# Patient Record
Sex: MALE | Race: BLACK OR AFRICAN AMERICAN | URBAN - METROPOLITAN AREA
[De-identification: names, ages, dates, MRNs, and addresses within clinical notes are randomized per-mention and may not be internally consistent; named-entity substitution may affect disease eponyms.]

---

## 2024-04-23 ENCOUNTER — OFFICE VISIT (OUTPATIENT)
Age: 54
End: 2024-04-23
Payer: MEDICAID

## 2024-04-23 VITALS — HEIGHT: 73 IN | BODY MASS INDEX: 41.75 KG/M2 | WEIGHT: 315 LBS

## 2024-04-23 DIAGNOSIS — E11.9 TYPE 2 DIABETES MELLITUS WITHOUT COMPLICATION, WITHOUT LONG-TERM CURRENT USE OF INSULIN (HCC): ICD-10-CM

## 2024-04-23 DIAGNOSIS — M17.12 PRIMARY OSTEOARTHRITIS OF LEFT KNEE: ICD-10-CM

## 2024-04-23 DIAGNOSIS — I10 HYPERTENSION, UNSPECIFIED TYPE: ICD-10-CM

## 2024-04-23 DIAGNOSIS — M54.50 LUMBAR PAIN: ICD-10-CM

## 2024-04-23 DIAGNOSIS — E66.01 MORBID OBESITY (HCC): ICD-10-CM

## 2024-04-23 DIAGNOSIS — G47.33 OSA (OBSTRUCTIVE SLEEP APNEA): ICD-10-CM

## 2024-04-23 DIAGNOSIS — M17.11 PRIMARY OSTEOARTHRITIS OF RIGHT KNEE: Primary | ICD-10-CM

## 2024-04-23 PROCEDURE — 20610 DRAIN/INJ JOINT/BURSA W/O US: CPT | Performed by: ORTHOPAEDIC SURGERY

## 2024-04-23 PROCEDURE — 73564 X-RAY EXAM KNEE 4 OR MORE: CPT | Performed by: ORTHOPAEDIC SURGERY

## 2024-04-23 PROCEDURE — 99204 OFFICE O/P NEW MOD 45 MIN: CPT | Performed by: ORTHOPAEDIC SURGERY

## 2024-04-23 RX ORDER — LOSARTAN POTASSIUM AND HYDROCHLOROTHIAZIDE 25; 100 MG/1; MG/1
1 TABLET ORAL DAILY
COMMUNITY

## 2024-04-23 RX ORDER — INSULIN LISPRO 100 [IU]/ML
INJECTION, SOLUTION INTRAVENOUS; SUBCUTANEOUS
COMMUNITY

## 2024-04-23 RX ORDER — ASPIRIN 81 MG/1
81 TABLET ORAL DAILY
COMMUNITY
Start: 2024-03-28

## 2024-04-23 RX ORDER — LIDOCAINE HYDROCHLORIDE 10 MG/ML
2 INJECTION, SOLUTION INFILTRATION; PERINEURAL ONCE
Status: COMPLETED | OUTPATIENT
Start: 2024-04-23 | End: 2024-04-23

## 2024-04-23 RX ORDER — ATORVASTATIN CALCIUM 20 MG/1
TABLET, FILM COATED ORAL
COMMUNITY

## 2024-04-23 RX ORDER — CARVEDILOL 25 MG/1
25 TABLET ORAL 2 TIMES DAILY
COMMUNITY

## 2024-04-23 RX ORDER — INSULIN GLARGINE 100 [IU]/ML
50 INJECTION, SOLUTION SUBCUTANEOUS
COMMUNITY

## 2024-04-23 RX ORDER — TOPIRAMATE 25 MG/1
TABLET ORAL
COMMUNITY
Start: 2018-10-09

## 2024-04-23 RX ORDER — TRIAMCINOLONE ACETONIDE 40 MG/ML
80 INJECTION, SUSPENSION INTRA-ARTICULAR; INTRAMUSCULAR ONCE
Status: COMPLETED | OUTPATIENT
Start: 2024-04-23 | End: 2024-04-23

## 2024-04-23 RX ORDER — MELOXICAM 15 MG/1
15 TABLET ORAL DAILY
Qty: 30 TABLET | Refills: 2 | Status: SHIPPED | OUTPATIENT
Start: 2024-04-23 | End: 2024-07-22

## 2024-04-23 RX ADMIN — TRIAMCINOLONE ACETONIDE 80 MG: 40 INJECTION, SUSPENSION INTRA-ARTICULAR; INTRAMUSCULAR at 09:14

## 2024-04-23 RX ADMIN — LIDOCAINE HYDROCHLORIDE 2 ML: 10 INJECTION, SOLUTION INFILTRATION; PERINEURAL at 09:13

## 2024-04-23 NOTE — ASSESSMENT & PLAN NOTE
After obtaining written consent for right knee intra-articular injection the patient was prepped in normal sterile fashion with freeze spray and alcohol.  80mg kenalog and 2mL 2% lidocaine was injected .  The needle was withdrawn, the area was cleansed and a sterile bandage was applied.  The patient tolerated the procedure well.

## 2024-04-23 NOTE — PROGRESS NOTES
Patient: Juan Flores                MRN: [id number]       SSN: [id number]  YOB: 1970        AGE: 53 y.o.        SEX: male  BMI: Body mass index is 53.83 kg/m².    PCP: No primary care provider on file.  04/23/24    Chief Complaint: Knee Pain (Bahman knee )      1. Primary osteoarthritis of right knee  Assessment & Plan:  After obtaining written consent for right knee intra-articular injection the patient was prepped in normal sterile fashion with freeze spray and alcohol.  80mg kenalog and 2mL 2% lidocaine was injected .  The needle was withdrawn, the area was cleansed and a sterile bandage was applied.  The patient tolerated the procedure well.     Orders:  -     AMB POC XRAY, KNEE; COMPLETE, 4+ VIEW  -     Ambulatory referral to Physical Therapy  -     DME Order for (Specify) as OP  -     DRAIN/INJECT LARGE JOINT/BURSA  -     triamcinolone acetonide (KENALOG-40) injection 80 mg; 80 mg, Intra-artICUlar, ONCE, 1 dose, On Tue 4/23/24 at 0930  -     lidocaine 1 % injection 2 mL; 2 mL, Intra-artICUlar, ONCE, 1 dose, On Tue 4/23/24 at 0930  2. Primary osteoarthritis of left knee  -     AMB POC XRAY, KNEE; COMPLETE, 4+ VIEW  -     Ambulatory referral to Physical Therapy  -     DME Order for (Specify) as OP  3. Morbid obesity (HCC)  -     Ambulatory Referral to Bariatric Surgery  4. Type 2 diabetes mellitus without complication, without long-term current use of insulin (HCC)  5. Hypertension, unspecified type  6. AMI (obstructive sleep apnea)  7. Lumbar pain  -     Ambulatory referral to Physical Therapy  -     Ambulatory referral to Physicial Medicine Rehab        HPI:  Juan Flores is a 53 y.o. male with chief complaint of   Chief Complaint   Patient presents with    Knee Pain     Bahman knee      Bilateral knee pain for years.  He takes Tylenol and uses Voltaren gel as well as brace on the right knee which helps.  He reports his pain is an 8 out of 10.  He gets throbbing cracking and both knees with an 
no

## 2024-04-26 ENCOUNTER — TELEPHONE (OUTPATIENT)
Age: 54
End: 2024-04-26

## 2024-04-26 NOTE — TELEPHONE ENCOUNTER
Patient wife called in and states that the patient need a Bariatric Surgeon, in the [facility], or [facility] because the bus will not be able to bring him that far. Patient wife also will like to know what pharmacy was the patient meloxicam was sent to       Please call and advise Yolanda Flores at  [phone]

## 2024-04-29 DIAGNOSIS — M17.12 PRIMARY OSTEOARTHRITIS OF LEFT KNEE: ICD-10-CM

## 2024-04-29 DIAGNOSIS — M17.11 PRIMARY OSTEOARTHRITIS OF RIGHT KNEE: Primary | ICD-10-CM

## 2024-04-29 RX ORDER — MELOXICAM 15 MG/1
15 TABLET ORAL DAILY
Qty: 30 TABLET | Refills: 2 | Status: SHIPPED | OUTPATIENT
Start: 2024-04-29 | End: 2024-07-28

## 2024-04-29 NOTE — TELEPHONE ENCOUNTER
Spoke to patient and his wife and they informed me that the shot has help and the pharmacy is [facility] (in the system) and I also informed them that when the weight loss center call and than request the location. They verbalized understanding.

## 2024-07-01 ENCOUNTER — HOSPITAL ENCOUNTER (OUTPATIENT)
Facility: HOSPITAL | Age: 54
Setting detail: RECURRING SERIES
Discharge: HOME | End: 2024-07-04
Payer: MEDICAID

## 2024-07-01 PROCEDURE — 97162 PT EVAL MOD COMPLEX 30 MIN: CPT
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
## 2024-07-05 DIAGNOSIS — M17.11 PRIMARY OSTEOARTHRITIS OF RIGHT KNEE: ICD-10-CM

## 2024-07-05 DIAGNOSIS — M17.12 PRIMARY OSTEOARTHRITIS OF LEFT KNEE: ICD-10-CM

## 2024-07-08 RX ORDER — MELOXICAM 15 MG/1
15 TABLET ORAL DAILY
Qty: 30 TABLET | Refills: 2 | Status: SHIPPED | OUTPATIENT
Start: 2024-07-08

## 2024-07-25 ENCOUNTER — APPOINTMENT (OUTPATIENT)
Facility: HOSPITAL | Age: 54
End: 2024-07-25
Payer: MEDICAID

## 2024-07-30 ENCOUNTER — HOSPITAL ENCOUNTER (OUTPATIENT)
Facility: HOSPITAL | Age: 54
Setting detail: RECURRING SERIES
End: 2024-07-30
Payer: MEDICAID

## 2024-08-02 ENCOUNTER — APPOINTMENT (OUTPATIENT)
Facility: HOSPITAL | Age: 54
End: 2024-08-02
Payer: MEDICAID

## 2024-08-06 ENCOUNTER — HOSPITAL ENCOUNTER (OUTPATIENT)
Facility: HOSPITAL | Age: 54
Setting detail: RECURRING SERIES
Discharge: HOME | End: 2024-08-09
Payer: MEDICAID

## 2024-08-06 PROCEDURE — 97112 NEUROMUSCULAR REEDUCATION: CPT

## 2024-08-06 PROCEDURE — 97110 THERAPEUTIC EXERCISES: CPT

## 2024-08-06 PROCEDURE — 97530 THERAPEUTIC ACTIVITIES: CPT

## 2024-08-06 NOTE — PROGRESS NOTES
PHYSICAL / OCCUPATIONAL THERAPY - DAILY TREATMENT NOTE (updated )    Patient Name: Juan Flores    Date: 2024    : 1970  Insurance: Payor: [facility] MEDICAID / Plan: [facility] PLAN CARDINAL CARE / Product Type: *No Product type* /      Patient  verified yes     Visit #   Current / Total 2 10 Total Time   Time   In / Out 11:00 11:40 40   Pain   In / Out 11 10    Subjective Functional Status/Changes: I fell down the stairs this weekend. I was trying to visit my sister at home. She is dying. There is nothing we can do.       TREATMENT AREA =  Right knee pain [M25.561]  Left knee pain [M25.562]  Other low back pain [M54.59]    OBJECTIVE    Therapeutic Procedures:  40  Total 40  Total [facility] Totals Reminder: bill using total billable min of TIMED therapeutic procedures (example: do not include dry needle or estim unattended, both untimed codes, in totals to left)  8-22 min = 1 unit; 23-37 min = 2 units; 38-52 min = 3 units; 53-67 min = 4 units; 68-82 min = 5 units   Tx Min Billable or 1:1 Min (if diff from Tx Min) Procedure, Rationale, Specifics   10  07750 Therapeutic Exercise (timed):  increase ROM, strength, coordination, balance, and proprioception to improve patient's ability to progress to PLOF and address remaining functional goals. (see flow sheet as applicable)   Details if applicable:       20  47381 Therapeutic Activity (timed):  use of dynamic activities replicating functional movements to increase ROM, strength, coordination, balance, and proprioception in order to improve patient's ability to progress to PLOF and address remaining functional goals.  (see flow sheet as applicable)   Details if applicable:  discussion concerning HEP, taking time for self care during this difficult time for pt and his family, reiterating goal of therapy to improve functional mobility     10  67508 Neuromuscular Re-Education (timed):  improve balance, coordination, kinesthetic sense, posture, core

## 2024-08-06 NOTE — PROGRESS NOTES
[facility] [place]   [address] [place] [address]  Phone: [phone] Fax: [phone]  PROGRESS NOTE  Patient Name: Juan Flores : 1970   Treatment/Medical Diagnosis: Right knee pain [M25.561]  Left knee pain [M25.562]  Other low back pain [M54.59]   Referral Source: Cornelius Castelan DO Payor: Payor: [facility] MEDICAID / Plan: [facility] PLAN CARDINAL CARE / Product Type: *No Product type* /    Date of Initial Visit: 24 Attended Visits: 2 Missed Visits: 1     SUMMARY OF TREATMENT  The pt is a 53 year old male presenting with bilateral knee pain and low back pain. Treatment has consisted of: therapeutic exercise to improve LE/lumbar strength/mobility; therapeutic activities to improve transfer/lift/carry ability; neuromuscular re-education to improve balance, core stability, neuromuscular control with lifting; physical agent/modality for symptom management; manual therapy for symptom relief and muscle flexibility; patient education to improve symptom management; self Care training; home safety training; stair training, and functional mobility training.    CURRENT STATUS  Patient has attended PT for 2 sessions for the treatment of bilateral knee pain and low back pain. The patient demonstrates no change at this time since start of care. His progress is limited due to difficulty attending therapy secondary to insurance authorization wait and wanting to be with his family during this difficult time as his sister is in hospice care. He plans to initiate HEP to begin addressing his mobility impairments. Will continue to assess response to care and progress as tolerated.     Goals:  Short Term Goals: To be accomplished in 4 treatments  Patient will report daily compliance with HEP to improve tolerance to household ambulation to increase independence with ADLs.  Status at last assessment: plan to issues HEP at visit 2  Current:

## 2024-08-09 ENCOUNTER — APPOINTMENT (OUTPATIENT)
Facility: HOSPITAL | Age: 54
End: 2024-08-09
Payer: MEDICAID

## 2024-08-13 ENCOUNTER — HOSPITAL ENCOUNTER (OUTPATIENT)
Facility: HOSPITAL | Age: 54
Setting detail: RECURRING SERIES
End: 2024-08-13
Payer: MEDICAID

## 2024-08-15 ENCOUNTER — APPOINTMENT (OUTPATIENT)
Facility: HOSPITAL | Age: 54
End: 2024-08-15
Payer: MEDICAID

## 2024-08-20 ENCOUNTER — APPOINTMENT (OUTPATIENT)
Facility: HOSPITAL | Age: 54
End: 2024-08-20
Payer: MEDICAID

## 2024-08-23 ENCOUNTER — APPOINTMENT (OUTPATIENT)
Facility: HOSPITAL | Age: 54
End: 2024-08-23
Payer: MEDICAID

## 2024-08-27 ENCOUNTER — APPOINTMENT (OUTPATIENT)
Facility: HOSPITAL | Age: 54
End: 2024-08-27
Payer: MEDICAID

## 2024-08-30 ENCOUNTER — APPOINTMENT (OUTPATIENT)
Facility: HOSPITAL | Age: 54
End: 2024-08-30
Payer: MEDICAID